# Patient Record
Sex: FEMALE | URBAN - METROPOLITAN AREA
[De-identification: names, ages, dates, MRNs, and addresses within clinical notes are randomized per-mention and may not be internally consistent; named-entity substitution may affect disease eponyms.]

---

## 2024-12-04 ENCOUNTER — APPOINTMENT (RX ONLY)
Age: 79
Setting detail: DERMATOLOGY
End: 2024-12-04

## 2024-12-04 DIAGNOSIS — L90.8 OTHER ATROPHIC DISORDERS OF SKIN: ICD-10-CM

## 2024-12-04 PROCEDURE — ? PRESCRIPTION MEDICATION MANAGEMENT

## 2024-12-04 PROCEDURE — ? COUNSELING

## 2024-12-04 PROCEDURE — 99202 OFFICE O/P NEW SF 15 MIN: CPT

## 2024-12-04 ASSESSMENT — LOCATION SIMPLE DESCRIPTION DERM
LOCATION SIMPLE: RIGHT FOREARM
LOCATION SIMPLE: LEFT FOREARM

## 2024-12-04 ASSESSMENT — LOCATION DETAILED DESCRIPTION DERM
LOCATION DETAILED: LEFT DISTAL DORSAL FOREARM
LOCATION DETAILED: RIGHT DISTAL DORSAL FOREARM

## 2024-12-04 ASSESSMENT — LOCATION ZONE DERM: LOCATION ZONE: ARM

## 2024-12-04 NOTE — PROCEDURE: PRESCRIPTION MEDICATION MANAGEMENT
Plan: Pt reports sunbathing for many years and losing 20-30 lbs after hip replacement \\nDiscussed high protein diet and collagen supplements can contribute to the body’s production of collagen based on a few studies\\nDiscussed lactic acid creams such as Eucerin Advanced Repair Cream or Amlactin Rapid Relief\\nDiscussed regular sunscreen use as sun can break down collagen\\nPt elects to trial OTC recommendations
Render In Strict Bullet Format?: No
Detail Level: Zone

## 2024-12-04 NOTE — HPI: BODY LOCATION - ARM
Additional History: Pt reports history of tanning in the sun for a couple hours a couple of days week every year until now and no area of her skin is painful or bothersome